# Patient Record
Sex: FEMALE | NOT HISPANIC OR LATINO | URBAN - METROPOLITAN AREA
[De-identification: names, ages, dates, MRNs, and addresses within clinical notes are randomized per-mention and may not be internally consistent; named-entity substitution may affect disease eponyms.]

---

## 2017-11-11 ENCOUNTER — IMPORTED ENCOUNTER (OUTPATIENT)
Dept: URBAN - METROPOLITAN AREA CLINIC 1 | Facility: CLINIC | Age: 57
End: 2017-11-11

## 2017-11-11 PROBLEM — H25.13: Noted: 2017-11-11

## 2017-11-11 PROBLEM — E11.9: Noted: 2017-11-11

## 2017-11-11 PROBLEM — Z79.84: Noted: 2017-11-11

## 2017-11-11 PROCEDURE — 92014 COMPRE OPH EXAM EST PT 1/>: CPT

## 2017-11-11 PROCEDURE — 92015 DETERMINE REFRACTIVE STATE: CPT

## 2017-11-11 NOTE — PATIENT DISCUSSION
1.  DM Type II without sign of diabetic retinopathy and no blot heme on dilated retinal examination today OU No Macular Edema:  Discussed the pathophysiology of diabetes and its effect on the eye and risk of blindness. Stressed the importance of strong glucose control. Advised of importance of at least yearly dilated examinations but to contact us immediately for any problems or concerns. 2. Type II diabetes controlled by oral medications. 3.  Cataract OU: Observe for now without intervention. The patient was advised to contact us if any change or worsening of vision4. Return for an appointment in 1 year for 30. with Dr. Travis Ford.

## 2018-11-26 ENCOUNTER — IMPORTED ENCOUNTER (OUTPATIENT)
Dept: URBAN - METROPOLITAN AREA CLINIC 1 | Facility: CLINIC | Age: 58
End: 2018-11-26

## 2018-11-26 PROBLEM — H25.813: Noted: 2018-11-26

## 2018-11-26 PROBLEM — Z79.84: Noted: 2018-11-26

## 2018-11-26 PROBLEM — E11.9: Noted: 2018-11-26

## 2018-11-26 PROCEDURE — 92015 DETERMINE REFRACTIVE STATE: CPT

## 2018-11-26 PROCEDURE — 92014 COMPRE OPH EXAM EST PT 1/>: CPT

## 2018-11-26 NOTE — PATIENT DISCUSSION
1.  DM Type II (Oral Medication) without sign of diabetic retinopathy and no blot heme on dilated retinal examination today OU No Macular Edema:  Discussed the pathophysiology of diabetes and its effect on the eye and risk of blindness. Stressed the importance of strong glucose control. Advised of importance of at least yearly dilated examinations but to contact us immediately for any problems or concerns. 2. Cataract OU: Observe for now without intervention.  The patient was advised to contact us if any change or worsening of visionMRX for glasses given

## 2019-10-09 ENCOUNTER — HOSPITAL ENCOUNTER (EMERGENCY)
Age: 59
Discharge: HOME | End: 2019-10-09
Attending: EMERGENCY MEDICINE | Admitting: EMERGENCY MEDICINE
Payer: OTHER GOVERNMENT

## 2019-10-09 ENCOUNTER — APPOINTMENT (OUTPATIENT)
Dept: GENERAL RADIOLOGY | Age: 59
End: 2019-10-09
Attending: EMERGENCY MEDICINE
Payer: OTHER GOVERNMENT

## 2019-10-09 VITALS
TEMPERATURE: 97.9 F | DIASTOLIC BLOOD PRESSURE: 88 MMHG | WEIGHT: 158 LBS | BODY MASS INDEX: 24.8 KG/M2 | OXYGEN SATURATION: 97 % | HEART RATE: 74 BPM | HEIGHT: 67 IN | SYSTOLIC BLOOD PRESSURE: 150 MMHG | RESPIRATION RATE: 18 BRPM

## 2019-10-09 DIAGNOSIS — S63.91XA HAND SPRAIN, RIGHT, INITIAL ENCOUNTER: Primary | ICD-10-CM

## 2019-10-09 PROCEDURE — 73130 X-RAY EXAM OF HAND: CPT

## 2019-10-09 PROCEDURE — 99282 EMERGENCY DEPT VISIT SF MDM: CPT

## 2019-10-09 RX ORDER — AMLODIPINE BESYLATE 5 MG/1
5 TABLET ORAL DAILY
COMMUNITY

## 2019-10-09 RX ORDER — LOSARTAN POTASSIUM AND HYDROCHLOROTHIAZIDE 25; 100 MG/1; MG/1
1 TABLET ORAL DAILY
COMMUNITY

## 2019-10-09 RX ORDER — GLIMEPIRIDE 1 MG/1
1 TABLET ORAL
COMMUNITY

## 2019-10-09 NOTE — DISCHARGE INSTRUCTIONS
Patient Education     Please return immediately to the Emergency Room for re-evaluation if you are not improving, develop any new symptoms, or develop worsening of current symptoms! If you have been prescribed a medication and are unable to take this medication for any reason, please return to the Emergency Department for further evaluation! If you have been referred for follow-up to a specialist, but are unable to follow-up and your symptoms are either not improving or are worsening, please return to the Emergency Department for further evaluation! Hand Sprain: Care Instructions  Your Care Instructions  A hand sprain occurs when you stretch or tear a ligament in your hand. Ligaments are the tough tissues that connect one bone to another. Most hand sprains will heal with treatment you can do at home. Follow-up care is a key part of your treatment and safety. Be sure to make and go to all appointments, and call your doctor if you are having problems. It's also a good idea to know your test results and keep a list of the medicines you take. How can you care for yourself at home? · If your doctor gave you a splint or immobilizer, wear it as directed. This will help keep swelling down and help your hand heal.  · Follow your doctor's directions for exercise and other activity. · For the first 2 days after your injury, avoid things that might increase swelling, such as hot showers, hot tubs, or hot packs. · Put ice or a cold pack on your hand for 10 to 20 minutes at a time to stop swelling. Try this every 1 to 2 hours for 3 days (when you are awake) or until the swelling goes down. Put a thin cloth between the ice pack and your skin. Keep your splint dry. · After 2 or 3 days, if your swelling is gone, put a heating pad (set on low) or a warm cloth on your hand. Some experts suggest that you go back and forth between hot and cold treatments.   · Prop up your hand on a pillow when you ice it or anytime you sit or lie down. Try to keep it above the level of your heart. This will help reduce swelling. · Take pain medicines exactly as directed. ? If the doctor gave you a prescription medicine for pain, take it as prescribed. ? If you are not taking a prescription pain medicine, ask your doctor if you can take an over-the-counter medicine. · Return to your usual level of activity slowly. When should you call for help? Call your doctor now or seek immediate medical care if:    · Your pain is worse.     · You have new or increased swelling in your hand.     · You cannot move your hand.     · You have tingling, weakness, or numbness in your hand or fingers.     · Your hand or fingers are cool or pale or change color.     · You have a fever.     · Your hand or fingers are red.    Watch closely for changes in your health, and be sure to contact your doctor if:    · Your hand does not get better as expected. Where can you learn more? Go to http://dami-oniel.info/. Enter T231 in the search box to learn more about \"Hand Sprain: Care Instructions. \"  Current as of: June 26, 2019  Content Version: 12.2  © 1544-2666 [employer]. Care instructions adapted under license by [employer] (which disclaims liability or warranty for this information). If you have questions about a medical condition or this instruction, always ask your healthcare professional. [address] any warranty or liability for your use of this information.

## 2019-10-09 NOTE — ED NOTES
Patient discharged and given discharge instructions. Patient had an opportunity to ask questions. Patient verbalized understanding of discharge instructions. Patient d/c from ED ambulatory, discharge instructions and prescriptions in hand. Patient accompanied by self.

## 2019-10-09 NOTE — ED NOTES
Patient presents to ED with C/O right hand pain that started today while pulling down a lot of magazines off the belt at work. Patient is A&Ox4 and aware of plan of care. The patient is in NAD.

## 2019-10-09 NOTE — LETTER
NOTIFICATION RETURN TO WORK / SCHOOL 
 
10/09/2019 11:18 AM 
 
Ms. Onur Ibarra [phone] [id number] To Whom It May Concern: 
 
Onur Ibarra is currently under the care of [facility] EMERGENCY DEPT. She will return to work/school on: 10/12/2019 If there are questions or concerns please have the patient contact our office. Sincerely, Leonidas Mackay RN

## 2019-10-09 NOTE — ED PROVIDER NOTES
EMERGENCY DEPARTMENT HISTORY AND PHYSICAL EXAM    2:03 PM      Date: 10/9/2019  Patient Name: Zhen Thompson    History of Presenting Illness     Chief Complaint   Patient presents with    Hand Pain         History Provided By: Patient    Chief Complaint: right hand pain  Duration: 1 Hours  Timing:  Acute  Location:   Quality: Aching  Severity: 7 out of 10  Modifying Factors: none  Associated Symptoms: denies any other associated signs or symptoms      Additional History (Context):Jocelynn Schofield is a 64 y.o. female with a pertinent history of DM, HTN, HLD who presents to the emergency department for evaluation of diffuse right hand pain x1 hour. Patient states she was pulling a piece of heavy equipment at work when her hand began to hurt. She states that there was no specific injury or trauma. She states she thinks it may be from overuse from working with her hands. She is unable to locate a specific spot on her hand that is painful. No swelling or associated numbness. No recent illnesses. PCP:  Tod Anderson MD      Current Outpatient Medications   Medication Sig Dispense Refill    levothyroxine sodium (LEVOTHROID PO) Take 25 mg by mouth.  SIMVASTATIN PO Take 10 mg by mouth.  SITagliptin-metFORMIN (JANUMET XR)  mg TM24 Take 50 mg by mouth.  glimepiride (AMARYL) 1 mg tablet Take 1 mg by mouth Daily (before breakfast).  losartan-hydroCHLOROthiazide (HYZAAR) 100-25 mg per tablet Take 1 Tab by mouth daily.  amLODIPine (NORVASC) 5 mg tablet Take 5 mg by mouth daily. Past History     Past Medical History:  Past Medical History:   Diagnosis Date    Diabetes ([address]     Hypertension     Ill-defined condition     high cholesterol       Past Surgical History:  History reviewed. No pertinent surgical history. Family History:  History reviewed. No pertinent family history.     Social History:  Social History     Tobacco Use    Smoking status: Never Smoker    Smokeless tobacco: Never Used   Substance Use Topics    Alcohol use: Never     Frequency: Never    Drug use: Never       Allergies:  No Known Allergies      Review of Systems       Review of Systems   Constitutional: Negative for chills and fever. HENT: Negative for congestion, rhinorrhea and sore throat. Respiratory: Negative for cough and shortness of breath. Cardiovascular: Negative for chest pain. Gastrointestinal: Negative for abdominal pain, blood in stool, constipation, diarrhea, nausea and vomiting. Genitourinary: Negative for dysuria, frequency and hematuria. Musculoskeletal: Positive for arthralgias. Negative for back pain, joint swelling and myalgias. Skin: Negative for rash and wound. Neurological: Negative for dizziness, numbness and headaches. All other systems reviewed and are negative. Physical Exam     Visit Vitals  /88 (BP 1 Location: Right arm, BP Patient Position: At rest;Sitting)   Pulse 74   Temp 97.9 °F (36.6 °C)   Resp 18   Ht 5' 6.5\" (1.689 m)   Wt 71.7 kg (158 lb)   SpO2 97%   BMI 25.12 kg/m²       Physical Exam   Constitutional: She is oriented to person, place, and time. She appears well-developed and well-nourished. No distress. HENT:   Head: Normocephalic and atraumatic. Eyes: Conjunctivae are normal.   Neck: Normal range of motion. Neck supple. No thyromegaly present. Cardiovascular: Normal rate, regular rhythm and normal heart sounds. Pulmonary/Chest: Effort normal and breath sounds normal. No respiratory distress. She exhibits no tenderness. Musculoskeletal: She exhibits no edema or deformity. Unremarkable physical examination of right hand without erythema, edema, ecchymosis, or skin disruption. Patient moving right hand with full range of motion and strength. Intact distal sensation and cap refill less than 2 seconds. 2+ radial pulse right upper extremity. Lymphadenopathy:     She has no cervical adenopathy.    Neurological: She is alert and oriented to person, place, and time. She has normal reflexes. Skin: Skin is warm and dry. She is not diaphoretic. Psychiatric: She has a normal mood and affect. Nursing note and vitals reviewed. Diagnostic Study Results     Labs -  No results found for this or any previous visit (from the past 12 hour(s)). Radiologic Studies -   No results found. Medical Decision Making   I am the first provider for this patient. I reviewed the vital signs, available nursing notes, past medical history, past surgical history, family history and social history. Vital Signs-Reviewed the patient's vital signs. Pulse Oximetry Analysis -  97% on room air (Interpretation)    Records Reviewed: Nursing Notes and Old Medical Records (Time of Review: 2:03 PM)    ED Course: Progress Notes, Reevaluation, and Consults:    Provider Notes (Medical Decision Making):   differential diagnosis: Sprain, contusion, fracture    Plan: Patient presents ambulatory in no significant distress with normal vitals. Unremarkable examination of right hand without any traumatic findings. Normal range of motion. Intact distal neurovascular status. X-ray negative. Will discharge home with OTC Tylenol or Motrin. Follow-up with PCP. At this time, patient is stable and appropriate for discharge home. Patient demonstrates understanding of current diagnoses and is in agreement with the treatment plan. They are advised that while the likelihood of serious underlying condition is low at this point given the evaluation performed today, we cannot fully rule it out. They are advised to immediately return with any new symptoms or worsening of current condition. All questions have been answered. Patient is given educational material regarding their diagnoses, including danger symptoms and when to return to the ED. Diagnosis     Clinical Impression:   1.  Hand sprain, right, initial encounter        Disposition: DC Home    Follow-up Information     Follow up With Specialties Details Why Contact Info    Stella Mccall MD [facility] Practice Call in 2 days  [address] [facility] EMERGENCY DEPT Emergency Medicine Go to As needed, If symptoms worsen [phone]  [phone]           Patient's Medications   Start Taking    No medications on file   Continue Taking    AMLODIPINE (NORVASC) 5 MG TABLET    Take 5 mg by mouth daily. GLIMEPIRIDE (AMARYL) 1 MG TABLET    Take 1 mg by mouth Daily (before breakfast). LEVOTHYROXINE SODIUM (LEVOTHROID PO)    Take 25 mg by mouth. LOSARTAN-HYDROCHLOROTHIAZIDE (HYZAAR) 100-25 MG PER TABLET    Take 1 Tab by mouth daily. SIMVASTATIN PO    Take 10 mg by mouth. SITAGLIPTIN-METFORMIN (JANUMET XR)  MG TM24    Take 50 mg by mouth. These Medications have changed    No medications on file   Stop Taking    No medications on file     _______________________________    This note was dictated utilizing voice recognition software which may lead to typographical errors. I apologize in advance if the situation occurs. If questions arise please do not hesitate to contact me or call our department.   Andrews Flores PA-C

## 2019-11-25 ENCOUNTER — IMPORTED ENCOUNTER (OUTPATIENT)
Dept: URBAN - METROPOLITAN AREA CLINIC 1 | Facility: CLINIC | Age: 59
End: 2019-11-25

## 2019-11-25 PROBLEM — H25.813: Noted: 2019-11-25

## 2019-11-25 PROBLEM — H04.123: Noted: 2019-11-25

## 2019-11-25 PROBLEM — E11.9: Noted: 2019-11-25

## 2019-11-25 PROBLEM — Z79.84: Noted: 2019-11-25

## 2019-11-25 PROCEDURE — 92014 COMPRE OPH EXAM EST PT 1/>: CPT

## 2019-11-25 NOTE — PATIENT DISCUSSION
1.  DM Type II (Oral Meds) -- Without sign of diabetic retinopathy and no blot heme on dilated retinal examination today OU and no Macular Edema OU: Discussed the pathophysiology of diabetes and its effect on the eye and risk of blindness. Stressed the importance of strong glucose control. Advised of importance of at least yearly dilated examinations but to contact us immediately for any problems or concerns. 2. Cataracts OU -- Observe for now without intervention. The patient was advised to contact us if any change or worsening of vision. 3. Dry Eyes OU -- Recommend the frequent use of OTC AT's BID-QID OU Routinely (Sample of Refresh Relieva Given). Letter to Dr. Rand Norris. Patient defers the refraction at today's visit. Return for an appointment in 1 YR for a 30 OU with Dr. Cynthia Schmitt.

## 2020-11-30 ENCOUNTER — IMPORTED ENCOUNTER (OUTPATIENT)
Dept: URBAN - METROPOLITAN AREA CLINIC 1 | Facility: CLINIC | Age: 60
End: 2020-11-30

## 2020-11-30 PROBLEM — E11.9: Noted: 2020-11-30

## 2020-11-30 PROBLEM — H04.123: Noted: 2020-11-30

## 2020-11-30 PROBLEM — Z79.84: Noted: 2020-11-30

## 2020-11-30 PROBLEM — H25.813: Noted: 2020-11-30

## 2020-11-30 PROCEDURE — 92014 COMPRE OPH EXAM EST PT 1/>: CPT

## 2020-11-30 PROCEDURE — 92015 DETERMINE REFRACTIVE STATE: CPT

## 2020-11-30 NOTE — PATIENT DISCUSSION
1.  DM Type II (Oral Meds)- Without sign of diabetic retinopathy and no blot heme on dilated retinal examination today OU and no Macular Edema OU: Discussed the pathophysiology of diabetes and its effect on the eye and risk of blindness. Stressed the importance of strong glucose control. Advised of importance of at least yearly dilated examinations but to contact us immediately for any problems or concerns. 2. Cataracts OU- Observe for now without intervention. The patient was advised to contact us if any change or worsening of vision. 3. Dry Eyes OU- Recommend the use of AT's BID OUMRX for glasses given. Letter to [address] for an appointment in 1 yr 27 with Dr. Hernandez.

## 2021-11-30 ENCOUNTER — IMPORTED ENCOUNTER (OUTPATIENT)
Dept: URBAN - METROPOLITAN AREA CLINIC 1 | Facility: CLINIC | Age: 61
End: 2021-11-30

## 2021-11-30 PROBLEM — E11.9: Noted: 2021-11-30

## 2021-11-30 PROBLEM — H04.123: Noted: 2021-11-30

## 2021-11-30 PROBLEM — Z79.84: Noted: 2021-11-30

## 2021-11-30 PROBLEM — H25.813: Noted: 2021-11-30

## 2021-11-30 PROCEDURE — 92014 COMPRE OPH EXAM EST PT 1/>: CPT

## 2021-11-30 NOTE — PATIENT DISCUSSION
1.  DM Type II (Oral Meds)- Without sign of diabetic retinopathy and no blot heme on dilated retinal examination today OU and no Macular Edema OU: Discussed the pathophysiology of diabetes and its effect on the eye and risk of blindness. Stressed the importance of strong glucose control. Advised of importance of at least yearly dilated examinations but to contact us immediately for any problems or concerns. 2. Cataracts OU- Observe for now without intervention. The patient was advised to contact us if any change or worsening of vision. 3. Dry Eyes OU- Cont. the use of AT's BID OU Patient deferred Manifest Rx today. Letter to [address] for an appointment in 1 year 27 with Dr. Jose R Rojas.

## 2022-04-02 ASSESSMENT — TONOMETRY
OS_IOP_MMHG: 18
OD_IOP_MMHG: 17
OD_IOP_MMHG: 17
OS_IOP_MMHG: 18
OS_IOP_MMHG: 16
OD_IOP_MMHG: 16
OD_IOP_MMHG: 18
OS_IOP_MMHG: 17
OD_IOP_MMHG: 18
OS_IOP_MMHG: 18

## 2022-04-02 ASSESSMENT — VISUAL ACUITY
OD_CC: 20/20-1
OD_SC: J2
OD_CC: 20/25
OS_CC: 20/20-2
OS_CC: 20/20-2
OS_CC: 20/20
OS_CC: 20/25
OD_CC: 20/25
OD_CC: 20/20
OS_CC: 20/25
OS_SC: J2
OD_CC: 20/25

## 2022-12-02 ENCOUNTER — COMPREHENSIVE EXAM (OUTPATIENT)
Dept: URBAN - METROPOLITAN AREA CLINIC 1 | Facility: CLINIC | Age: 62
End: 2022-12-02

## 2022-12-02 PROCEDURE — 92014 COMPRE OPH EXAM EST PT 1/>: CPT

## 2022-12-02 PROCEDURE — 92015 DETERMINE REFRACTIVE STATE: CPT

## 2022-12-02 ASSESSMENT — KERATOMETRY
OS_K1POWER_DIOPTERS: 43.75
OS_AXISANGLE2_DEGREES: 113
OS_AXISANGLE_DEGREES: 023
OD_AXISANGLE_DEGREES: 011
OD_K1POWER_DIOPTERS: 44.00
OD_AXISANGLE2_DEGREES: 101
OD_K2POWER_DIOPTERS: 44.25
OS_K2POWER_DIOPTERS: 44.00

## 2022-12-02 ASSESSMENT — VISUAL ACUITY
OS_SC: 20/30
OD_SC: 20/25

## 2022-12-02 ASSESSMENT — TONOMETRY
OD_IOP_MMHG: 17
OS_IOP_MMHG: 18

## 2023-12-05 ENCOUNTER — COMPREHENSIVE EXAM (OUTPATIENT)
Dept: URBAN - METROPOLITAN AREA CLINIC 1 | Facility: CLINIC | Age: 63
End: 2023-12-05

## 2023-12-05 DIAGNOSIS — H25.813: ICD-10-CM

## 2023-12-05 DIAGNOSIS — E11.9: ICD-10-CM

## 2023-12-05 DIAGNOSIS — H04.123: ICD-10-CM

## 2023-12-05 PROCEDURE — 92014 COMPRE OPH EXAM EST PT 1/>: CPT

## 2023-12-05 ASSESSMENT — TONOMETRY
OS_IOP_MMHG: 19
OD_IOP_MMHG: 19

## 2023-12-05 ASSESSMENT — VISUAL ACUITY
OS_SC: 20/30
OD_SC: 20/25
OD_BAT: 20/40
OD_SC: J5
OS_BAT: 20/40
OS_SC: J2

## 2023-12-05 ASSESSMENT — KERATOMETRY
OD_AXISANGLE_DEGREES: 011
OS_K1POWER_DIOPTERS: 43.75
OS_K2POWER_DIOPTERS: 44.00
OD_AXISANGLE2_DEGREES: 101
OS_AXISANGLE_DEGREES: 023
OD_K1POWER_DIOPTERS: 44.00
OS_AXISANGLE2_DEGREES: 113
OD_K2POWER_DIOPTERS: 44.25

## 2024-12-10 ENCOUNTER — COMPREHENSIVE EXAM (OUTPATIENT)
Age: 64
End: 2024-12-10

## 2024-12-10 DIAGNOSIS — E11.3293: ICD-10-CM

## 2024-12-10 DIAGNOSIS — H04.123: ICD-10-CM

## 2024-12-10 DIAGNOSIS — H25.813: ICD-10-CM

## 2024-12-10 DIAGNOSIS — H16.143: ICD-10-CM

## 2024-12-10 PROCEDURE — 92014 COMPRE OPH EXAM EST PT 1/>: CPT

## 2025-07-30 ENCOUNTER — FOLLOW UP (OUTPATIENT)
Age: 65
End: 2025-07-30

## 2025-07-30 DIAGNOSIS — E11.3293: ICD-10-CM

## 2025-07-30 PROCEDURE — 92012 INTRM OPH EXAM EST PATIENT: CPT
